# Patient Record
Sex: FEMALE | Race: WHITE | HISPANIC OR LATINO | Employment: FULL TIME | URBAN - METROPOLITAN AREA
[De-identification: names, ages, dates, MRNs, and addresses within clinical notes are randomized per-mention and may not be internally consistent; named-entity substitution may affect disease eponyms.]

---

## 2019-06-07 ENCOUNTER — TELEPHONE (OUTPATIENT)
Dept: OBSTETRICS AND GYNECOLOGY | Facility: CLINIC | Age: 39
End: 2019-06-07

## 2019-06-07 NOTE — TELEPHONE ENCOUNTER
Last seen 2015 states she has a bartholin cyst that ruptured and requesting a rx.  Advised she will have to be seen first since she has not been seen since 2015.  Scheduled 6/13.  Advised if it gets worse she should go to PCP, urgent care or the ER.

## 2019-06-13 ENCOUNTER — OFFICE VISIT (OUTPATIENT)
Dept: OBSTETRICS AND GYNECOLOGY | Facility: CLINIC | Age: 39
End: 2019-06-13
Attending: OBSTETRICS & GYNECOLOGY
Payer: COMMERCIAL

## 2019-06-13 VITALS
SYSTOLIC BLOOD PRESSURE: 100 MMHG | HEIGHT: 62 IN | DIASTOLIC BLOOD PRESSURE: 60 MMHG | WEIGHT: 116.94 LBS | BODY MASS INDEX: 21.52 KG/M2

## 2019-06-13 DIAGNOSIS — L02.91 ABSCESS: Primary | ICD-10-CM

## 2019-06-13 PROCEDURE — 87077 CULTURE AEROBIC IDENTIFY: CPT

## 2019-06-13 PROCEDURE — 99999 PR PBB SHADOW E&M-EST. PATIENT-LVL III: CPT | Mod: PBBFAC,,, | Performed by: OBSTETRICS & GYNECOLOGY

## 2019-06-13 PROCEDURE — 99999 PR PBB SHADOW E&M-EST. PATIENT-LVL III: ICD-10-PCS | Mod: PBBFAC,,, | Performed by: OBSTETRICS & GYNECOLOGY

## 2019-06-13 PROCEDURE — 87070 CULTURE OTHR SPECIMN AEROBIC: CPT

## 2019-06-13 PROCEDURE — 3008F BODY MASS INDEX DOCD: CPT | Mod: CPTII,S$GLB,, | Performed by: OBSTETRICS & GYNECOLOGY

## 2019-06-13 PROCEDURE — 87147 CULTURE TYPE IMMUNOLOGIC: CPT

## 2019-06-13 PROCEDURE — 87529 HSV DNA AMP PROBE: CPT

## 2019-06-13 PROCEDURE — 3008F PR BODY MASS INDEX (BMI) DOCUMENTED: ICD-10-PCS | Mod: CPTII,S$GLB,, | Performed by: OBSTETRICS & GYNECOLOGY

## 2019-06-13 PROCEDURE — 87186 SC STD MICRODIL/AGAR DIL: CPT

## 2019-06-13 PROCEDURE — 99203 PR OFFICE/OUTPT VISIT, NEW, LEVL III, 30-44 MIN: ICD-10-PCS | Mod: S$GLB,,, | Performed by: OBSTETRICS & GYNECOLOGY

## 2019-06-13 PROCEDURE — 99203 OFFICE O/P NEW LOW 30 MIN: CPT | Mod: S$GLB,,, | Performed by: OBSTETRICS & GYNECOLOGY

## 2019-06-13 RX ORDER — BUSPIRONE HYDROCHLORIDE 7.5 MG/1
TABLET ORAL
Refills: 1 | COMMUNITY
Start: 2019-03-27 | End: 2019-06-13

## 2019-06-13 RX ORDER — CLONAZEPAM 0.5 MG/1
TABLET ORAL
COMMUNITY
Start: 2019-06-12 | End: 2021-12-20

## 2019-06-13 NOTE — PROGRESS NOTES
Subjective:       Patient ID: Melania Lipscomb is a 39 y.o. female.    Chief Complaint:  Gynecologic Exam (Bartholin cyst in vaginal area)      There is no problem list on file for this patient.      History of Present Illness  39 y.o. yo  here for evaluation of possible bartholin's abscess. New pt old file. Has been getting boils in inguinal area when ever she is on her period and ovulating. Wondering if it is a pimple like hormonal acne. They are usually in inguinal area but this one was on left labia majora. It popped a few days ago and she wants to make sure it is not infected. Periods heavy since BTL. Previous c/s x 4. Discussed options including but not limited to OCP- if boils are hormonal that may solve both that and menorrhagia. Pt will think about it.       History reviewed. No pertinent past medical history.    Past Surgical History:   Procedure Laterality Date     SECTION      TUBAL LIGATION  2011       OB History    Para Term  AB Living   4         4   SAB TAB Ectopic Multiple Live Births                  # Outcome Date GA Lbr Abhi/2nd Weight Sex Delivery Anes PTL Lv   4             3             2             1                 Patient's last menstrual period was 2019.   Date of Last Pap: No result found    Review of Systems  Review of Systems   Constitutional: Negative for fatigue and unexpected weight change.   Respiratory: Negative for shortness of breath.    Cardiovascular: Negative for chest pain.   Gastrointestinal: Negative for abdominal pain, constipation, diarrhea, nausea and vomiting.   Genitourinary: Negative for dysuria.   Musculoskeletal: Negative for back pain.   Skin: Negative for rash.   Neurological: Negative for headaches.   Hematological: Does not bruise/bleed easily.   Psychiatric/Behavioral: Negative for behavioral problems.        Objective:   Physical Exam:   Constitutional: She appears well-developed and  well-nourished.               Genitourinary:         Genitourinary Comments: Small ulcer base of about a 1.5 cm resolving mass. I did HSV culture as precaution because the ulcer is so prominent but may just be the opening of abscess that is resolving                      Assessment/ Plan:     1. Abscess  Aerobic culture    HSV by Rapid PCR, Non-Blood [facility]; Vagina    HSV by Rapid PCR, Non-Blood [facility]; Vagina       Follow-up with me for annual  Will call with results  Antibacterial soap  No shaving

## 2019-06-14 LAB
HSV1 DNA SPEC QL NAA+PROBE: NEGATIVE
HSV2 DNA SPEC QL NAA+PROBE: NEGATIVE
SPECIMEN SOURCE: NORMAL

## 2019-06-17 LAB
BACTERIA SPEC AEROBE CULT: NORMAL
BACTERIA SPEC AEROBE CULT: NORMAL

## 2019-06-18 RX ORDER — SULFAMETHOXAZOLE AND TRIMETHOPRIM 800; 160 MG/1; MG/1
1 TABLET ORAL 2 TIMES DAILY
Qty: 14 TABLET | Refills: 0 | Status: CANCELLED | OUTPATIENT
Start: 2019-06-18 | End: 2019-06-25

## 2019-06-18 NOTE — TELEPHONE ENCOUNTER
Call pt swab from labial abscess shows staph infection. I am sending in an antibiotic. Herpes swab was normal.     Actually no pharmacy in. Please enter and send back to me and I will send in bactrim. Or pend bactrim DS po BID x 7 days

## 2019-06-19 RX ORDER — SULFAMETHOXAZOLE AND TRIMETHOPRIM 800; 160 MG/1; MG/1
2 TABLET ORAL DAILY
Qty: 14 TABLET | Refills: 0 | Status: SHIPPED | OUTPATIENT
Start: 2019-06-19 | End: 2021-07-30

## 2019-06-27 ENCOUNTER — LAB VISIT (OUTPATIENT)
Dept: LAB | Facility: OTHER | Age: 39
End: 2019-06-27
Attending: OBSTETRICS & GYNECOLOGY
Payer: COMMERCIAL

## 2019-06-27 ENCOUNTER — OFFICE VISIT (OUTPATIENT)
Dept: OBSTETRICS AND GYNECOLOGY | Facility: CLINIC | Age: 39
End: 2019-06-27
Attending: OBSTETRICS & GYNECOLOGY
Payer: COMMERCIAL

## 2019-06-27 VITALS — WEIGHT: 117.31 LBS | BODY MASS INDEX: 21.59 KG/M2 | HEIGHT: 62 IN

## 2019-06-27 DIAGNOSIS — Z11.3 SCREENING EXAMINATION FOR STD (SEXUALLY TRANSMITTED DISEASE): ICD-10-CM

## 2019-06-27 DIAGNOSIS — Z12.4 ENCOUNTER FOR PAPANICOLAOU SMEAR FOR CERVICAL CANCER SCREENING: Primary | ICD-10-CM

## 2019-06-27 DIAGNOSIS — Z01.419 ENCOUNTER FOR GYNECOLOGICAL EXAMINATION (GENERAL) (ROUTINE) WITHOUT ABNORMAL FINDINGS: ICD-10-CM

## 2019-06-27 DIAGNOSIS — N92.0 MENORRHAGIA WITH REGULAR CYCLE: ICD-10-CM

## 2019-06-27 DIAGNOSIS — Z22.321 STAPHYLOCOCCUS CARRIER: ICD-10-CM

## 2019-06-27 DIAGNOSIS — Z11.51 ENCOUNTER FOR SCREENING FOR HUMAN PAPILLOMAVIRUS (HPV): ICD-10-CM

## 2019-06-27 PROCEDURE — 88175 CYTOPATH C/V AUTO FLUID REDO: CPT

## 2019-06-27 PROCEDURE — 86592 SYPHILIS TEST NON-TREP QUAL: CPT

## 2019-06-27 PROCEDURE — 87340 HEPATITIS B SURFACE AG IA: CPT

## 2019-06-27 PROCEDURE — 99395 PREV VISIT EST AGE 18-39: CPT | Mod: S$GLB,,, | Performed by: OBSTETRICS & GYNECOLOGY

## 2019-06-27 PROCEDURE — 86703 HIV-1/HIV-2 1 RESULT ANTBDY: CPT

## 2019-06-27 PROCEDURE — 99999 PR PBB SHADOW E&M-EST. PATIENT-LVL II: CPT | Mod: PBBFAC,,, | Performed by: OBSTETRICS & GYNECOLOGY

## 2019-06-27 PROCEDURE — 36415 COLL VENOUS BLD VENIPUNCTURE: CPT

## 2019-06-27 PROCEDURE — 87624 HPV HI-RISK TYP POOLED RSLT: CPT

## 2019-06-27 PROCEDURE — 99999 PR PBB SHADOW E&M-EST. PATIENT-LVL II: ICD-10-PCS | Mod: PBBFAC,,, | Performed by: OBSTETRICS & GYNECOLOGY

## 2019-06-27 PROCEDURE — 99395 PR PREVENTIVE VISIT,EST,18-39: ICD-10-PCS | Mod: S$GLB,,, | Performed by: OBSTETRICS & GYNECOLOGY

## 2019-06-27 PROCEDURE — 87491 CHLMYD TRACH DNA AMP PROBE: CPT

## 2019-06-27 RX ORDER — MUPIROCIN 20 MG/G
OINTMENT TOPICAL 2 TIMES DAILY
Qty: 30 G | Refills: 1 | Status: SHIPPED | OUTPATIENT
Start: 2019-06-27 | End: 2019-07-04

## 2019-06-27 NOTE — PROGRESS NOTES
Subjective:       Patient ID: Melania Lipscomb is a 39 y.o. female.    Chief Complaint:  Annual Exam (last pap 2016 normal via pt)      There is no problem list on file for this patient.      History of Present Illness  39 y.o. yo  here for annual exam. Boil better after antibiotics, explained staph to pt. All questions answered. rec Bactroban. Also wants to try OCP for menorrhagia since BTL. Wants STD testing   I explained new pap and HPV guidelines. Will do pap and HPV test today. Will repeat pap and HPV every 3 years. Answered all questions. Patient agrees.     History reviewed. No pertinent past medical history.    Past Surgical History:   Procedure Laterality Date     SECTION      TUBAL LIGATION  2011       OB History    Para Term  AB Living   4         4   SAB TAB Ectopic Multiple Live Births                  # Outcome Date GA Lbr Abhi/2nd Weight Sex Delivery Anes PTL Lv   4             3             2             1                 Patient's last menstrual period was 2019 (exact date).   Date of Last Pap: No result found    Review of Systems  Review of Systems   Constitutional: Negative for fatigue and unexpected weight change.   Respiratory: Negative for shortness of breath.    Cardiovascular: Negative for chest pain.   Gastrointestinal: Negative for abdominal pain, constipation, diarrhea, nausea and vomiting.   Genitourinary: Negative for dysuria.   Musculoskeletal: Negative for back pain.   Skin: Negative for rash.   Neurological: Negative for headaches.   Hematological: Does not bruise/bleed easily.   Psychiatric/Behavioral: Negative for behavioral problems.        Objective:   Physical Exam:   Constitutional: She is oriented to person, place, and time. Vital signs are normal. She appears well-developed and well-nourished. No distress.        Pulmonary/Chest: She exhibits no mass. Right breast exhibits no mass, no nipple discharge, no skin  change, no tenderness, no bleeding and no swelling. Left breast exhibits no mass, no nipple discharge, no skin change, no tenderness, no bleeding and no swelling. Breasts are symmetrical.        Abdominal: Soft. Normal appearance and bowel sounds are normal. She exhibits no distension and no mass. There is no tenderness. There is no rebound.     Genitourinary: Vagina normal and uterus normal. There is no rash, tenderness, lesion or injury on the right labia. There is no rash, tenderness, lesion or injury on the left labia. Uterus is not deviated, not enlarged, not fixed, not tender, not hosting fibroids and not experiencing uterine prolapse. Cervix is normal. Right adnexum displays no mass, no tenderness and no fullness. Left adnexum displays no mass, no tenderness and no fullness. No erythema, tenderness, rectocele, cystocele or unspecified prolapse of vaginal walls in the vagina. No vaginal discharge found. Cervix exhibits no motion tenderness, no discharge and no friability.           Musculoskeletal: Normal range of motion and moves all extremeties.      Lymphadenopathy:     She has no axillary adenopathy.        Right: No supraclavicular adenopathy present.        Left: No supraclavicular adenopathy present.    Neurological: She is alert and oriented to person, place, and time.    Skin: Skin is warm and dry.    Psychiatric: She has a normal mood and affect. Her behavior is normal. Judgment normal.        Assessment/ Plan:     1. Encounter for Papanicolaou smear for cervical cancer screening  Liquid-based pap smear, screening   2. Encounter for screening for human papillomavirus (HPV)  HPV High Risk Genotypes, PCR   3. Screening examination for STD (sexually transmitted disease)  RPR    C. trachomatis/N. gonorrhoeae by AMP DNA    Hepatitis B surface antigen    HIV 1/2 Ag/Ab (4th Gen)   4. Staphylococcus carrier  mupirocin (BACTROBAN) 2 % ointment   5. Menorrhagia with regular cycle  norgestrel-ethinyl estradiol  (LO/OVRAL) 0.3-30 mg-mcg per tablet   6. Encounter for gynecological examination (general) (routine) without abnormal findings         Follow-up with me in 1 year

## 2019-06-28 LAB
C TRACH DNA SPEC QL NAA+PROBE: NOT DETECTED
HBV SURFACE AG SERPL QL IA: NEGATIVE
HIV 1+2 AB+HIV1 P24 AG SERPL QL IA: NEGATIVE
N GONORRHOEA DNA SPEC QL NAA+PROBE: NOT DETECTED
RPR SER QL: NORMAL

## 2019-07-03 LAB
HPV HR 12 DNA CVX QL NAA+PROBE: NEGATIVE
HPV16 AG SPEC QL: NEGATIVE
HPV18 DNA SPEC QL NAA+PROBE: NEGATIVE

## 2024-03-27 ENCOUNTER — TELEPHONE (OUTPATIENT)
Dept: HEMATOLOGY/ONCOLOGY | Facility: CLINIC | Age: 44
End: 2024-03-27
Payer: COMMERCIAL

## 2024-03-27 NOTE — TELEPHONE ENCOUNTER
LVM for pt to call back to schedule from [facility] referral.  Referral to diagnostic clinic in [facility].  Try to patsy w/ Dr Senior for April 9th.      Pt returned call and discussed diagnostic clinic referral.  Scheduled pt with first available new pt dx clinic appt, April 9th.  Pt confirmed appt date time and location.

## 2024-04-09 ENCOUNTER — LAB VISIT (OUTPATIENT)
Dept: LAB | Facility: HOSPITAL | Age: 44
End: 2024-04-09
Attending: INTERNAL MEDICINE
Payer: COMMERCIAL

## 2024-04-09 ENCOUNTER — OFFICE VISIT (OUTPATIENT)
Dept: HEMATOLOGY/ONCOLOGY | Facility: CLINIC | Age: 44
End: 2024-04-09
Payer: COMMERCIAL

## 2024-04-09 VITALS
DIASTOLIC BLOOD PRESSURE: 67 MMHG | OXYGEN SATURATION: 97 % | SYSTOLIC BLOOD PRESSURE: 99 MMHG | WEIGHT: 129.44 LBS | TEMPERATURE: 97 F | HEART RATE: 63 BPM | BODY MASS INDEX: 23.67 KG/M2

## 2024-04-09 DIAGNOSIS — R59.0 INGUINAL LYMPHADENOPATHY: ICD-10-CM

## 2024-04-09 LAB
CRP SERPL-MCNC: 0.5 MG/L (ref 0–8.2)
ERYTHROCYTE [SEDIMENTATION RATE] IN BLOOD BY PHOTOMETRIC METHOD: 5 MM/HR (ref 0–36)
LDH SERPL L TO P-CCNC: 215 U/L (ref 110–260)

## 2024-04-09 PROCEDURE — 99999 PR PBB SHADOW E&M-EST. PATIENT-LVL III: CPT | Mod: PBBFAC,,, | Performed by: INTERNAL MEDICINE

## 2024-04-09 PROCEDURE — 3078F DIAST BP <80 MM HG: CPT | Mod: CPTII,S$GLB,, | Performed by: INTERNAL MEDICINE

## 2024-04-09 PROCEDURE — 86668 FRANCISELLA TULARENSIS: CPT | Performed by: INTERNAL MEDICINE

## 2024-04-09 PROCEDURE — 36415 COLL VENOUS BLD VENIPUNCTURE: CPT | Mod: PN | Performed by: INTERNAL MEDICINE

## 2024-04-09 PROCEDURE — 3074F SYST BP LT 130 MM HG: CPT | Mod: CPTII,S$GLB,, | Performed by: INTERNAL MEDICINE

## 2024-04-09 PROCEDURE — 3008F BODY MASS INDEX DOCD: CPT | Mod: CPTII,S$GLB,, | Performed by: INTERNAL MEDICINE

## 2024-04-09 PROCEDURE — 85652 RBC SED RATE AUTOMATED: CPT | Performed by: INTERNAL MEDICINE

## 2024-04-09 PROCEDURE — 86140 C-REACTIVE PROTEIN: CPT | Performed by: INTERNAL MEDICINE

## 2024-04-09 PROCEDURE — 99204 OFFICE O/P NEW MOD 45 MIN: CPT | Mod: S$GLB,,, | Performed by: INTERNAL MEDICINE

## 2024-04-09 PROCEDURE — 83615 LACTATE (LD) (LDH) ENZYME: CPT | Mod: PN | Performed by: INTERNAL MEDICINE

## 2024-04-09 PROCEDURE — 1159F MED LIST DOCD IN RCRD: CPT | Mod: CPTII,S$GLB,, | Performed by: INTERNAL MEDICINE

## 2024-04-09 NOTE — PROGRESS NOTES
DIAGNOSIS CLINIC NOTE    Name: Melania Lipscomb  MRN:  [id number]  :  1980 Age 44 y.o.  Date of Service: 2024    Reason for visit:  Melania Lipscomb is a 44 y.o. female here regarding right inguinal lymphadenopathy.    ONCOLOGICAL HISTORY/HISTORY OF PRESENT ILLNESS:  Melania Lipscomb has medical problems including    Melania Lipscomb presents to diagnosis for evaluation of right inguinal lymphadenopathy greater than 1 month    Patient reports as a referral from PCP after having a STD workup and generalized workup for right inguinal localized lymphadenopathy which was persistent since approximately Feb.  She reports she initially thought this was related to her menstrual cycles but reports it was persistent.    She has had STD testing including gonorrhea and chlamydia, HIV, hepatitis all of which have been negative.  She reports a lymphadenopathy is painless.    US of Right groin (3/26/24)-There are 3 lymph nodes visualized in the right groin, 1 of which measures 1.0 x 0.9 x 0.8 cm, 1 of which measures 2.2 x 0.8 x 0.9 cm, and the other 1.0 x 0.4 x 0.7 cm. In the left groin there are 2 nodes visualized 1 of which measures 0.8 x 0.5 x 0.9 cm and the other 0.6 x 0.5 x 0.6 cm.     She has no B symptoms and has weight gain over the last 3 months.  She reports she had a transient GI illness associated with mild diarrhea prior to the lymphadenopathy.    She reports her daughter recently brought home a bunny as a pet.     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      Past Medical History:   Diagnosis Date    Anemia     Migraines     Vertigo of central origin        Past Surgical History:   Procedure Laterality Date     SECTION      TUBAL LIGATION  2011       Allergies as of 2024 - Reviewed 2024   Allergen Reaction Noted    Hydrocodone Other (See Comments) 2014       Family History   Problem Relation Age of Onset     Breast cancer Mother         50's       Social History     Tobacco Use    Smoking status: Never    Smokeless tobacco: Never   Substance Use Topics    Alcohol use: Yes    Drug use: No         PHYSICAL EXAMINATION:  BP 99/67 (BP Location: Right arm, Patient Position: Sitting, BP Method: Medium (Manual))   Pulse 63   Temp 96.9 °F (36.1 °C) (Temporal)   Wt 58.7 kg (129 lb 6.6 oz)   SpO2 97%   BMI 23.67 kg/m²   Wt Readings from Last 3 Encounters:   04/09/24 58.7 kg (129 lb 6.6 oz)   03/21/24 57.7 kg (127 lb 4.8 oz)   03/18/24 56.7 kg (125 lb)     ECOG PERFORMANCE STATUS: 0  General:  Well-appearing, nontoxic  Eyes:  Equal and round pupils, EOMI, no scleral icterus  Mouth:  No lesions, moist  Cardiovascular:  Warm, well-perfused, no peripheral edema  Lungs:  Unlabored on room air, no wheezing  Neurologic:  Awake, alert and oriented, participating in the exam  Psych:  Appropriate mood and affect  Skin:  Normal pallor, No rashes  Heme:  No petechiae, no purpura  Lymph:  Complete lymphadenopathy exam was unremarkable, no inguinal lymphadenopathy, no axillary lymphadenopathy, no cervical lymphadenopathy no occipital lymphadenopathy    LABORATORY:  CBC  Lab Results   Component Value Date    WBC 4.72 03/21/2024    HGB 12.0 03/21/2024    HCT 37.1 03/21/2024    MCV 88 03/21/2024     03/21/2024         BMP  Lab Results   Component Value Date     08/31/2023    K 4.4 08/31/2023     08/31/2023    CO2 23 08/31/2023    BUN 15 08/31/2023    CREATININE 0.83 08/31/2023    CALCIUM 9.1 08/31/2023    ANIONGAP 8 08/31/2023    ESTGFRAFRICA >60 12/28/2021    EGFRNONAA >60 12/28/2021           PERTINENT PATHOLOGY:        PERTINENT RADIOLOGY:  US of Right groin (3/26/24)-There are 3 lymph nodes visualized in the right groin, 1 of which measures 1.0 x 0.9 x 0.8 cm, 1 of which measures 2.2 x 0.8 x 0.9 cm, and the other 1.0 x 0.4 x 0.7 cm. In the left groin there are 2 nodes visualized 1 of which measures 0.8 x 0.5 x 0.9  cm and the other 0.6 x 0.5 x 0.6 cm.         ASSESSMENT AND PLAN:    Melania Lipscomb is a 44 y.o. female with...    # subjective right inguinal lymphadenopathy  -she has no B symptoms, physical exam is unremarkable, we checked inflammatory markers which were within normal limits  -LDH was normal which is reassuring from a leukemia/lymphoma standpoint although my suspicion was low  -review of her CBC from 03/21/2024 shows normal white count normal hemoglobin normal platelets and a completely normal differential making a hematological related process unlikely.  -she reports her daughter has a bunny that lives in her daughter's room, we will check for tularemia however this is unlikely but given she has an unusual pet its worth checking  -I favor this was a transient GI/viral illness and lymphadenopathy we will/has resolve on its own, repeating ultrasound of the right groin in 2 weeks, if LAD of one LN noted above is persistent would favor getting a core biopsy via IR, I counseled the patient on this plan and she was agreeable    Please note, presumed malignancy symptom management prior to tissue diagnosis remains the responsibility of the referring physician, primary care, or emergency department.  Symptom management will transfer to the treating oncologist after tissue diagnosis and initial visit with the treating oncologist.           Jillian Senior M.D.  Hematology/Oncology     Route Chart for Scheduling    Med Onc Chart Routing      Follow up with physician 2 weeks. 2 weeks vritual visit   Follow up with [facility]    Infusion scheduling note    Injection scheduling note    Labs   Scheduling:  Preferred lab:  Lab interval:  Collect all blood work today   Imaging   US 1 day prior to virtual visit in weeks   Pharmacy appointment    Other referrals

## 2024-04-11 PROBLEM — R59.0 INGUINAL LYMPHADENOPATHY: Status: ACTIVE | Noted: 2024-04-11

## 2024-04-11 LAB
F TULAR IGG SER QL IA: POSITIVE
F TULAR IGG+IGM SER-IMP: ABNORMAL
F TULAR IGM SER QL IA: NEGATIVE

## 2024-04-15 DIAGNOSIS — A21.9 TULAREMIA: Primary | ICD-10-CM

## 2024-04-15 RX ORDER — CIPROFLOXACIN 500 MG/1
500 TABLET ORAL 2 TIMES DAILY
Qty: 20 TABLET | Refills: 0 | Status: SHIPPED | OUTPATIENT
Start: 2024-04-15 | End: 2024-04-25

## 2024-04-25 ENCOUNTER — TELEPHONE (OUTPATIENT)
Dept: HEMATOLOGY/ONCOLOGY | Facility: CLINIC | Age: 44
End: 2024-04-25
Payer: COMMERCIAL

## 2024-04-25 NOTE — TELEPHONE ENCOUNTER
Scrubbing for 4/29 and found patient had cancelled her u/s appt for this follow up. Left message for patient to see if we need to reschedule follow up as well.

## 2024-05-22 ENCOUNTER — TELEPHONE (OUTPATIENT)
Dept: HEMATOLOGY/ONCOLOGY | Facility: CLINIC | Age: 44
End: 2024-05-22
Payer: COMMERCIAL

## 2024-05-22 NOTE — TELEPHONE ENCOUNTER
Attempt to contact the pt to reschedule her canceled f/u visit  in diagnosis clinica and [facility] for pt to call back.  Previous msg the pt would prefer Virtual visit.    Sent pt portal msg to pt.

## 2024-05-30 ENCOUNTER — OFFICE VISIT (OUTPATIENT)
Dept: FAMILY MEDICINE | Facility: CLINIC | Age: 44
End: 2024-05-30
Payer: COMMERCIAL

## 2024-05-30 VITALS
HEART RATE: 80 BPM | HEIGHT: 62 IN | BODY MASS INDEX: 24.18 KG/M2 | DIASTOLIC BLOOD PRESSURE: 62 MMHG | SYSTOLIC BLOOD PRESSURE: 90 MMHG | WEIGHT: 131.38 LBS | OXYGEN SATURATION: 98 %

## 2024-05-30 DIAGNOSIS — D64.9 LOW HEMOGLOBIN: ICD-10-CM

## 2024-05-30 DIAGNOSIS — R20.0 NUMBNESS: ICD-10-CM

## 2024-05-30 DIAGNOSIS — F32.89 OTHER DEPRESSION: ICD-10-CM

## 2024-05-30 DIAGNOSIS — R41.3 MEMORY LOSS: Primary | ICD-10-CM

## 2024-05-30 PROCEDURE — 3078F DIAST BP <80 MM HG: CPT | Mod: CPTII,S$GLB,, | Performed by: FAMILY MEDICINE

## 2024-05-30 PROCEDURE — 1159F MED LIST DOCD IN RCRD: CPT | Mod: CPTII,S$GLB,, | Performed by: FAMILY MEDICINE

## 2024-05-30 PROCEDURE — 99999 PR PBB SHADOW E&M-EST. PATIENT-LVL III: CPT | Mod: PBBFAC,,, | Performed by: FAMILY MEDICINE

## 2024-05-30 PROCEDURE — 3008F BODY MASS INDEX DOCD: CPT | Mod: CPTII,S$GLB,, | Performed by: FAMILY MEDICINE

## 2024-05-30 PROCEDURE — 99214 OFFICE O/P EST MOD 30 MIN: CPT | Mod: S$GLB,,, | Performed by: FAMILY MEDICINE

## 2024-05-30 PROCEDURE — 3074F SYST BP LT 130 MM HG: CPT | Mod: CPTII,S$GLB,, | Performed by: FAMILY MEDICINE

## 2024-05-30 RX ORDER — FLUOXETINE HYDROCHLORIDE 20 MG/1
20 CAPSULE ORAL DAILY
Qty: 90 CAPSULE | Refills: 3 | Status: SHIPPED | OUTPATIENT
Start: 2024-05-30 | End: 2025-05-30

## 2024-05-30 NOTE — PROGRESS NOTES
Assessment:       1. Memory loss    2. Low hemoglobin    3. Other depression    4. Numbness        Assessment & Plan  Memory loss:  New problem workup needed  -     Homocysteine, serum; Future; Expected date: 05/30/2024  -     Vitamin B12; Future; Expected date: 05/30/2024  -     Copper, serum; Future; Expected date: 05/30/2024  -     VITAMIN B6; Future; Expected date: 05/30/2024  -     Comprehensive Metabolic Panel; Future; Expected date: 05/30/2024    Low hemoglobin: New problem workup needed  -     CBC Auto Differential; Future; Expected date: 05/30/2024  -     Iron and TIBC; Future; Expected date: 05/30/2024  -     FERRITIN; Future; Expected date: 05/30/2024    Other depression: Worsening  -     FLUoxetine 20 MG capsule; Take 1 capsule (20 mg total) by mouth once daily.  Dispense: 90 capsule; Refill: 3    Numbness: New problem workup needed  -     Folate; Future; Expected date: 05/30/2024      The patient's thyroid function was evaluated 9 months ago, yielding normal results. Her hemoglobin levels were found to be on the lower side. A prescription for Prozac 20 mg, to be taken once daily in the morning, has been issued. Fasting blood work, including iron levels, homocysteine levels, vitamin B12, vitamin B6, and folic acid levels, will be conducted tomorrow morning. A Pap smear will be scheduled for her in 6 months.    Follow-up  A follow-up visit is scheduled for 6 months from now.      The patient's BMI has been recorded in the chart. The patient has been provided educational materials regarding the benefits of attaining and maintaining a normal weight. We will continue to address and follow this issue during follow up visits.   Patient agreed with assessment and plan. Patient verbalized understanding.     Subjective:       Patient ID: Melania Lipscomb is a 44 y.o. female.    Chief Complaint: Establish Care    HPI  History of Present Illness  The patient presents To establish a new primary care physician.    The  patient expresses a desire to switch her medication from Cymbalta to Prozac due to concerns about her memory. She reports difficulty in recalling minor tasks, a symptom she had not previously encountered. Her laboratory tests are current, and she recently consulted with a hematologist for an enlarged lymph node. She contracted COVID-19 twice, the most recent of which was in 11/2022. Over the past three months, she has experienced a weight gain of 15 pounds, which she attributes to her medication. Her memory loss has progressively worsened over the past year. She experiences heavy menstrual cycles, particularly during the first and third day, and experiences severe PMDD mood swings like two weeks prior to the onset of her menstrual cycle. She was previously on Prozac, which she found beneficial for her PMDD, but discontinued it due to memory issues. Cymbalta has been effective in managing her pain. She experienced a panic attack and anxiety attack yesterday, which prompted her to take Cymbalta. She has a history of numbness and tingling sensations, which have since resolved. She has a history of severe migraines 9 to 10 years ago, which occasionally cause facial numbness. She was referred to a neurologist in [place], but did not follow up. She continues to experience facial numbness, which she attributes to her severe migraines. She is currently experiencing an earache. She has not undergone a Pap smear.     Past medical history, past social history was reviewed and discussed with the patient.    Review of Systems   Respiratory:  Negative for apnea and chest tightness.    Cardiovascular:  Negative for chest pain and leg swelling.   Gastrointestinal:  Negative for abdominal distention and abdominal pain.   Neurological:  Positive for numbness and headaches.       Objective:      Physical Exam    Physical Exam  Vital Signs  Patient's weight is 131.   The patient is in no acute distress, lungs are clear to auscultation,  heart is regular rate and rhythm, extremity has no presence of edema, normal sensation, no weakness.  Results  Laboratory Studies  Hemoglobin was on the lower side.     This note was generated with the assistance of ambient listening technology. Verbal consent was obtained by the patient and accompanying visitor(s) for the recording of patient appointment to facilitate this note. I attest to having reviewed and edited the generated note for accuracy, though some syntax or spelling errors may persist. Please contact the author of this note for any clarification.

## 2024-06-05 ENCOUNTER — TELEPHONE (OUTPATIENT)
Dept: PSYCHIATRY | Facility: CLINIC | Age: 44
End: 2024-06-05
Payer: COMMERCIAL

## 2024-06-26 ENCOUNTER — PATIENT MESSAGE (OUTPATIENT)
Dept: PSYCHIATRY | Facility: CLINIC | Age: 44
End: 2024-06-26
Payer: COMMERCIAL

## 2024-06-28 ENCOUNTER — DOCUMENTATION ONLY (OUTPATIENT)
Dept: FAMILY MEDICINE | Facility: CLINIC | Age: 44
End: 2024-06-28
Payer: COMMERCIAL

## 2024-06-28 ENCOUNTER — OFFICE VISIT (OUTPATIENT)
Dept: FAMILY MEDICINE | Facility: CLINIC | Age: 44
End: 2024-06-28
Payer: COMMERCIAL

## 2024-06-28 ENCOUNTER — TELEPHONE (OUTPATIENT)
Dept: FAMILY MEDICINE | Facility: CLINIC | Age: 44
End: 2024-06-28

## 2024-06-28 ENCOUNTER — LAB VISIT (OUTPATIENT)
Dept: LAB | Facility: HOSPITAL | Age: 44
End: 2024-06-28
Attending: FAMILY MEDICINE
Payer: COMMERCIAL

## 2024-06-28 VITALS
DIASTOLIC BLOOD PRESSURE: 60 MMHG | HEART RATE: 96 BPM | SYSTOLIC BLOOD PRESSURE: 118 MMHG | OXYGEN SATURATION: 98 % | BODY MASS INDEX: 24.18 KG/M2 | HEIGHT: 62 IN | WEIGHT: 131.38 LBS

## 2024-06-28 DIAGNOSIS — R41.3 MEMORY LOSS: ICD-10-CM

## 2024-06-28 DIAGNOSIS — D64.9 LOW HEMOGLOBIN: ICD-10-CM

## 2024-06-28 DIAGNOSIS — R53.83 OTHER FATIGUE: ICD-10-CM

## 2024-06-28 DIAGNOSIS — G43.109 MIGRAINE WITH AURA AND WITHOUT STATUS MIGRAINOSUS, NOT INTRACTABLE: ICD-10-CM

## 2024-06-28 DIAGNOSIS — R20.0 NUMBNESS: ICD-10-CM

## 2024-06-28 DIAGNOSIS — F41.9 ANXIETY: Primary | ICD-10-CM

## 2024-06-28 LAB
ALBUMIN SERPL BCP-MCNC: 3.7 G/DL (ref 3.5–5.2)
ALP SERPL-CCNC: 58 U/L (ref 55–135)
ALT SERPL W/O P-5'-P-CCNC: 27 U/L (ref 10–44)
ANION GAP SERPL CALC-SCNC: 8 MMOL/L (ref 8–16)
AST SERPL-CCNC: 33 U/L (ref 10–40)
BASOPHILS # BLD AUTO: 0.05 K/UL (ref 0–0.2)
BASOPHILS NFR BLD: 0.8 % (ref 0–1.9)
BILIRUB SERPL-MCNC: 0.2 MG/DL (ref 0.1–1)
BUN SERPL-MCNC: 17 MG/DL (ref 6–20)
CALCIUM SERPL-MCNC: 9.3 MG/DL (ref 8.7–10.5)
CHLORIDE SERPL-SCNC: 106 MMOL/L (ref 95–110)
CO2 SERPL-SCNC: 26 MMOL/L (ref 23–29)
CREAT SERPL-MCNC: 1 MG/DL (ref 0.5–1.4)
DIFFERENTIAL METHOD BLD: ABNORMAL
EOSINOPHIL # BLD AUTO: 0.1 K/UL (ref 0–0.5)
EOSINOPHIL NFR BLD: 1.5 % (ref 0–8)
ERYTHROCYTE [DISTWIDTH] IN BLOOD BY AUTOMATED COUNT: 13.5 % (ref 11.5–14.5)
EST. GFR  (NO RACE VARIABLE): >60 ML/MIN/1.73 M^2
FERRITIN SERPL-MCNC: 21 NG/ML (ref 20–300)
FOLATE SERPL-MCNC: 7.5 NG/ML (ref 4–24)
GLUCOSE SERPL-MCNC: 89 MG/DL (ref 70–110)
HCT VFR BLD AUTO: 35.9 % (ref 37–48.5)
HCYS SERPL-SCNC: 5.8 UMOL/L (ref 4–15.5)
HGB BLD-MCNC: 11.9 G/DL (ref 12–16)
IMM GRANULOCYTES # BLD AUTO: 0.02 K/UL (ref 0–0.04)
IMM GRANULOCYTES NFR BLD AUTO: 0.3 % (ref 0–0.5)
IRON SERPL-MCNC: 71 UG/DL (ref 30–160)
LYMPHOCYTES # BLD AUTO: 1.7 K/UL (ref 1–4.8)
LYMPHOCYTES NFR BLD: 28 % (ref 18–48)
MCH RBC QN AUTO: 28.9 PG (ref 27–31)
MCHC RBC AUTO-ENTMCNC: 33.1 G/DL (ref 32–36)
MCV RBC AUTO: 87 FL (ref 82–98)
MONOCYTES # BLD AUTO: 0.3 K/UL (ref 0.3–1)
MONOCYTES NFR BLD: 5.4 % (ref 4–15)
NEUTROPHILS # BLD AUTO: 3.9 K/UL (ref 1.8–7.7)
NEUTROPHILS NFR BLD: 64 % (ref 38–73)
NRBC BLD-RTO: 0 /100 WBC
PLATELET # BLD AUTO: 269 K/UL (ref 150–450)
PMV BLD AUTO: 12.7 FL (ref 9.2–12.9)
POTASSIUM SERPL-SCNC: 4.1 MMOL/L (ref 3.5–5.1)
PROT SERPL-MCNC: 7 G/DL (ref 6–8.4)
RBC # BLD AUTO: 4.12 M/UL (ref 4–5.4)
SATURATED IRON: 17 % (ref 20–50)
SODIUM SERPL-SCNC: 140 MMOL/L (ref 136–145)
TOTAL IRON BINDING CAPACITY: 417 UG/DL (ref 250–450)
TRANSFERRIN SERPL-MCNC: 282 MG/DL (ref 200–375)
VIT B12 SERPL-MCNC: 1056 PG/ML (ref 210–950)
WBC # BLD AUTO: 6.08 K/UL (ref 3.9–12.7)

## 2024-06-28 PROCEDURE — 82525 ASSAY OF COPPER: CPT | Performed by: FAMILY MEDICINE

## 2024-06-28 PROCEDURE — 99999 PR PBB SHADOW E&M-EST. PATIENT-LVL III: CPT | Mod: PBBFAC,,, | Performed by: FAMILY MEDICINE

## 2024-06-28 PROCEDURE — 80053 COMPREHEN METABOLIC PANEL: CPT | Performed by: FAMILY MEDICINE

## 2024-06-28 PROCEDURE — 83540 ASSAY OF IRON: CPT | Performed by: FAMILY MEDICINE

## 2024-06-28 PROCEDURE — 84207 ASSAY OF VITAMIN B-6: CPT | Performed by: FAMILY MEDICINE

## 2024-06-28 PROCEDURE — 82728 ASSAY OF FERRITIN: CPT | Performed by: FAMILY MEDICINE

## 2024-06-28 PROCEDURE — 82607 VITAMIN B-12: CPT | Performed by: FAMILY MEDICINE

## 2024-06-28 PROCEDURE — 82746 ASSAY OF FOLIC ACID SERUM: CPT | Performed by: FAMILY MEDICINE

## 2024-06-28 PROCEDURE — 36415 COLL VENOUS BLD VENIPUNCTURE: CPT | Mod: PO | Performed by: FAMILY MEDICINE

## 2024-06-28 PROCEDURE — 85025 COMPLETE CBC W/AUTO DIFF WBC: CPT | Performed by: FAMILY MEDICINE

## 2024-06-28 PROCEDURE — 83090 ASSAY OF HOMOCYSTEINE: CPT | Performed by: FAMILY MEDICINE

## 2024-06-28 RX ORDER — RIMEGEPANT SULFATE 75 MG/75MG
75 TABLET, ORALLY DISINTEGRATING ORAL ONCE AS NEEDED
Qty: 8 TABLET | Refills: 0 | Status: SHIPPED | OUTPATIENT
Start: 2024-06-28 | End: 2024-06-28

## 2024-06-28 RX ORDER — ALPRAZOLAM 0.5 MG/1
0.5 TABLET ORAL NIGHTLY PRN
Qty: 15 TABLET | Refills: 0 | Status: SHIPPED | OUTPATIENT
Start: 2024-06-28 | End: 2024-07-13

## 2024-06-28 RX ORDER — FLUOXETINE 10 MG/1
10 CAPSULE ORAL DAILY
Qty: 30 CAPSULE | Refills: 11 | Status: SHIPPED | OUTPATIENT
Start: 2024-06-28 | End: 2025-06-28

## 2024-06-28 NOTE — PROGRESS NOTES
Assessment:       1. Anxiety    2. Other fatigue    3. Migraine with aura and without status migrainosus, not intractable    4. Memory loss        Plan:       Anxiety:  Worsening  -     FLUoxetine 10 MG capsule; Take 1 capsule (10 mg total) by mouth once daily.  Dispense: 30 capsule; Refill: 11  -     ALPRAZolam (XANAX) 0.5 MG tablet; Take 1 tablet (0.5 mg total) by mouth nightly as needed for Anxiety.  Dispense: 15 tablet; Refill: 0    Other fatigue:  Worsening    Migraine with aura and without status migrainosus, not intractable: Worsening  -     rimegepant (NURTEC) 75 mg odt; Take 1 tablet (75 mg total) by mouth once as needed for Migraine. Place ODT tablet on the tongue; alternatively the ODT tablet may be placed under the tongue  Dispense: 8 tablet; Refill: 0  -     Ambulatory referral/consult to Neurology; Future; Expected date: 07/05/2024    Memory loss: Worsening  -     MRI Brain Without Contrast; Future; Expected date: 06/28/2024         Will increase the dosage of the fluoxetine to 30 mg daily, will add fluoxetine capsules 10 mg to the 20 mg that the patient already is taking.  [place] prescription monitoring program was checked and okay, Xanax was prescribed only as needed.  Fifteen tablets and no refills were given to the patient.  Will start patient on Nurtec, as the patient migraine headaches are getting worse and not controlled with Tylenol extra-strength or ibuprofen.  Will also refer the patient to neurologist.  Because patient is been having new worsening symptoms in the last few weeks and patient never had imaging studies, will also order an MRI of the brain.   Patient agreed with assessment and plan. Patient verbalized understanding.     Subjective:       Patient ID: Melania Lipscomb is a 44 y.o. female.    Chief Complaint: Headache (Headache started Monday. On going until Thursday ) and Anxiety (Change medication dosage )    HPI    The patient is here today complaining of headaches, the patient  has been having headaches since she was little, never had a imaging study, she was diagnosed with migraine headaches for the last few weeks, the patient has been complaining worsening symptoms and not able to tolerate the headache as the symptoms sometimes are severe.  The patient stated that sometimes she feels numbness sensation in her face and shooting pains.  The numbness sensation can be on the right or the left side.  The patient also is been having worsening anxiety symptoms, she is currently taking fluoxetine, she would like to know if the medicine can be adjusted.  She has also been complaining of memory loss.  The patient stated that short term memory is the worse but she also has problems with long-term memory.  She is feeling very tired.    Past medical history, past social history was reviewed and discussed with the patient.    Review of Systems   Respiratory:  Negative for cough and choking.    Cardiovascular:  Negative for chest pain and leg swelling.   Neurological:  Positive for numbness and headaches.   Psychiatric/Behavioral:  Positive for dysphoric mood. The patient is nervous/anxious.        Objective:      Physical Exam  Vitals and nursing note reviewed.   Constitutional:       General: She is not in acute distress.     Appearance: Normal appearance. She is well-developed and normal weight. She is not diaphoretic.   HENT:      Head: Normocephalic and atraumatic.      Right Ear: External ear normal.      Left Ear: External ear normal.      Nose: Nose normal.   Eyes:      General: No scleral icterus.        Right eye: No discharge.         Left eye: No discharge.      Conjunctiva/sclera: Conjunctivae normal.      Pupils: Pupils are equal, round, and reactive to light.   Cardiovascular:      Rate and Rhythm: Normal rate and regular rhythm.      Heart sounds: Normal heart sounds.   Pulmonary:      Effort: Pulmonary effort is normal. No respiratory distress.      Breath sounds: Normal breath sounds.  No wheezing.   Musculoskeletal:         General: No tenderness or deformity.      Cervical back: Neck supple.   Neurological:      Mental Status: She is alert.   Psychiatric:         Mood and Affect: Mood is anxious.         Behavior: Behavior normal.         Thought Content: Thought content normal.         Judgment: Judgment normal.

## 2024-06-30 ENCOUNTER — PATIENT MESSAGE (OUTPATIENT)
Dept: FAMILY MEDICINE | Facility: CLINIC | Age: 44
End: 2024-06-30
Payer: COMMERCIAL

## 2024-06-30 DIAGNOSIS — G43.109 MIGRAINE WITH AURA AND WITHOUT STATUS MIGRAINOSUS, NOT INTRACTABLE: ICD-10-CM

## 2024-06-30 DIAGNOSIS — G43.009 MIGRAINE WITHOUT AURA AND WITHOUT STATUS MIGRAINOSUS, NOT INTRACTABLE: Primary | ICD-10-CM

## 2024-07-01 RX ORDER — SUMATRIPTAN 50 MG/1
TABLET, FILM COATED ORAL
Qty: 9 TABLET | Refills: 2 | Status: SHIPPED | OUTPATIENT
Start: 2024-07-01
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
## 2024-07-02 LAB — COPPER SERPL-MCNC: 1098 UG/L (ref 810–1990)

## 2024-07-03 ENCOUNTER — PATIENT MESSAGE (OUTPATIENT)
Dept: PSYCHIATRY | Facility: CLINIC | Age: 44
End: 2024-07-03
Payer: COMMERCIAL

## 2024-07-03 LAB — PYRIDOXAL SERPL-MCNC: 156 UG/L (ref 5–50)

## 2024-07-05 ENCOUNTER — TELEPHONE (OUTPATIENT)
Dept: NEUROLOGY | Facility: CLINIC | Age: 44
End: 2024-07-05
Payer: COMMERCIAL

## 2024-07-05 ENCOUNTER — CLINICAL SUPPORT (OUTPATIENT)
Dept: PSYCHIATRY | Facility: CLINIC | Age: 44
End: 2024-07-05
Payer: COMMERCIAL

## 2024-07-05 DIAGNOSIS — F41.1 GAD (GENERALIZED ANXIETY DISORDER): ICD-10-CM

## 2024-07-05 DIAGNOSIS — F33.1 MODERATE EPISODE OF RECURRENT MAJOR DEPRESSIVE DISORDER: Primary | ICD-10-CM

## 2024-07-05 PROCEDURE — 99999 PR PBB SHADOW E&M-EST. PATIENT-LVL II: CPT | Mod: PBBFAC,,, | Performed by: COUNSELOR

## 2024-07-05 NOTE — PROGRESS NOTES
Individual Psychotherapy (LPC)    07/19/2024    Interim Events/Subjective Report/Content of Current Session:  follow-up appointment.    Pt is a 44 y.o. female with past psychiatric hx of  Depression, anger, and anxiety who presents for follow-up treatment.    Pt stated this week she has been hyper-focused on work, has been more tired, more negative, experienced crying spells, and anxious thoughts. Pt identified she has more intense mood swings two weeks before her period. Pt processed a recent incident at work with feeling like she overreacted. Pt stated she had an internal anger outburst. Pt identified trigger of feeling like her time was being wasted due to repeating herself. Pt attempted to cope with the use of meditation at work. Pt explained she has trouble with clients who are entitled.     Pt processed feeling of frustration and guilt with not having enough time for her children. Pt has anger towards her ex for not being there for their children.     Pt has been utilizing some coping skills go meditations, prayer, and spending some time outside.     LPC and Pt discussed her current relationship. Pt identified boundaries with boyfriend. Pt stated she has been enjoying time they spend together.    LPC and Pt discussed utilizing a CBT negrito to track her mood swing and use a daily check in. LPC provided psycho education on the use of sour candy to help with anger/anxiety in the moment. Pt stated she will try the use of sour candy.     Pt appeared more anxious, on edge, and concerned about her medications causing her to be more tired.   Current symptoms:  Depression: worthlessness/guilt, hopelessness, fatigue, and difficulty concentrating.   Anxiety: excessive worrying, restlessness, muscle tension, panic attacks, and obsessions/compulsions. Pt has been having tension in her head and chest.   Sleep: difficulty falling asleep. Pt was taking Xanax lately.   Danette:  denies.  Psychosis: denies .  Appetite: Pt denied  appetite changes  Motivation/Energy: More tired since Prozac   Therapeutic Intervention/Techniques: behavior modification, insight oriented, and supportive; relevant to diagnosis, patient responds to this modality    Will continue to follow.   Pt aware to contact LPC for any additional needs that may occur prior to next session.    Risk Parameters:  Patient reports no suicidal ideation  Patient reports no homicidal ideation  Patient reports no self-injurious behavior  Patient reports no violent behavior    Diagnosis:   1. JOSIE (generalized anxiety disorder)        2. Moderate episode of recurrent major depressive disorder        3. Stress at home            Return to Clinic: 1 week  Counseling time: 45  -Call to report any worsening of symptoms or problems associated with medication.  - Pt instructed to go to ER if thoughts of harming self or others arise.   -Supportive therapy and psychoeducation provided  -Pt instructed to call clinic, 911 or go to nearest emergency room if sxs worsen or pt is in crisis. The pt expresses understanding.   Each patient to whom he or she provides medical services by telemedicine is:  (1) informed of the relationship between the physician and patient and the respective role of any other health care provider with respect to management of the patient; and (2) notified that he or she may decline to receive medical services by telemedicine and may withdraw from such care at any time.

## 2024-07-05 NOTE — TELEPHONE ENCOUNTER
Called and left voice mail for patient to call to schedule new patient appointment. Patient only wants to see MD.

## 2024-07-17 ENCOUNTER — PATIENT MESSAGE (OUTPATIENT)
Dept: PSYCHIATRY | Facility: CLINIC | Age: 44
End: 2024-07-17
Payer: COMMERCIAL

## 2024-07-19 ENCOUNTER — CLINICAL SUPPORT (OUTPATIENT)
Dept: PSYCHIATRY | Facility: CLINIC | Age: 44
End: 2024-07-19
Payer: COMMERCIAL

## 2024-07-19 DIAGNOSIS — F41.1 GAD (GENERALIZED ANXIETY DISORDER): Primary | ICD-10-CM

## 2024-07-19 DIAGNOSIS — F33.1 MODERATE EPISODE OF RECURRENT MAJOR DEPRESSIVE DISORDER: ICD-10-CM

## 2024-07-19 DIAGNOSIS — F43.9 STRESS AT HOME: ICD-10-CM

## 2024-07-19 PROCEDURE — 99999 PR PBB SHADOW E&M-EST. PATIENT-LVL I: CPT | Mod: PBBFAC,,, | Performed by: COUNSELOR

## 2024-07-19 NOTE — PROGRESS NOTES
Individual Psychotherapy (LPC)    07/29/2024    The patient location is: Pt reported in her car at work.  The patient phone number is: [phone]   Visit type: Virtual visit with synchronous audio and video  Each patient to whom he or she provides medical services by telemedicine is:  (1) informed of the relationship between the provider and patient and the respective role of any other health care provider with respect to management of the patient; and (2) notified that he or she may decline to receive medical services by telemedicine and may withdraw from such care at any time.  Crisis Disclaimer: Patient was informed that due to the virtual nature of the visit, that if a crisis develops, protocols will be implemented to ensure patient safety, including but not limited to: 1) Initiating a welfare check with local Law Enforcement, 2) Calling [employer]/National Crisis Hotline, and/or 3) Initiating PEC/CEC procedures.    Interim Events/Subjective Report/Content of Current Session:  follow-up appointment.    Pt is a 44 y.o. female with past psychiatric hx of Anxiety and Depression who presents for follow-up treatment.    Pt reported last week things were fine. Pt explained she had a steady work week. Pt reported last night her and her sister went well. Pt expressed anger towards her father. Pt explained she won't call him due to pride. Pt stated her father doesn't reach out. And doesn't check in. LPC asked about last time she spoke with her father on Father's Day. Pt explained weekend Pt spent time at home. Pt reported cleaning her room. Pt identified feelings of abandonment and seeking a deeper connection with him since the death of her mother.    Pt has stress with start school next week. Pt has money concerns to get her daughter a car. Pt has been working on boundaries with her children.      LPC and Pt discussed her boyfriend. Pt reported spent time with her boyfriend last week. Pt has concerns of him being resentful.  Pt explained her past relationships have impacted her current relationship. Pt identifed if she doesn't get attention she feels not wanted.    LPC and Pt discussed coping skills used over the last week. Pt stated she downloaded the [employer]. Pt noted feeling okay. Pt reported using meditations 1-2 daily, breathing exercises, HALT, and took personal day on Friday. Pt has been meditating in the morning and afternoon. Pt shared most morning feeling tired when using HALT.    LPC and Pt discussed negative self-talk. Pt reported catching herself daily. Pt explained she utilized social media for positive affirmation and meditations. Pt explained she has a lot of negative thoughts. LPC suggested for Pt counter negative thoughts with two positive statements, continue to use [employer], and continue HALT for our next visit.       LPC and Pt addressed goals of boundary setting, anger management, and anxiety.    Goals: boundary setting, anger management, anxiety.    Current symptoms:  Depression: insomnia, fatigue, and difficulty concentrating.  Anxiety: excessive worrying, restlessness, and muscle tension. Pt explained less anxious. Pt identified heighten anxiety 2 week before her period.  Sleep: difficulty falling asleep. Pt reported insomnia last night.   Danette:  denies.  Psychosis: denies .  Appetite: Pt reported consistent.   Motivation/Energy: Pt has still been struggling motivation.   Therapeutic Intervention/Techniques: behavior modification, insight oriented, and supportive; relevant to diagnosis, patient responds to this modality    Will continue to follow.   Pt aware to contact [facility] for any additional needs that may occur prior to next session.    Risk Parameters:  Patient reports no suicidal ideation  Patient reports no homicidal ideation  Patient reports no self-injurious behavior  Patient reports no violent behavior    Diagnosis:   1. JOSIE (generalized anxiety disorder)        2. Moderate episode of recurrent major  depressive disorder        3. Stress at home        4. Stress at work            Return to Clinic: 1 week  Counseling time: 45  -Call to report any worsening of symptoms or problems associated with medication.  - Pt instructed to go to ER if thoughts of harming self or others arise.   -Supportive therapy and psychoeducation provided  -Pt instructed to call clinic, 911 or go to nearest emergency room if sxs worsen or pt is in crisis. The pt expresses understanding.   Each patient to whom he or she provides medical services by telemedicine is:  (1) informed of the relationship between the physician and patient and the respective role of any other health care provider with respect to management of the patient; and (2) notified that he or she may decline to receive medical services by telemedicine and may withdraw from such care at any time.

## 2024-07-25 ENCOUNTER — PATIENT MESSAGE (OUTPATIENT)
Dept: PSYCHIATRY | Facility: CLINIC | Age: 44
End: 2024-07-25
Payer: COMMERCIAL

## 2024-07-29 ENCOUNTER — CLINICAL SUPPORT (OUTPATIENT)
Dept: PSYCHIATRY | Facility: CLINIC | Age: 44
End: 2024-07-29
Payer: COMMERCIAL

## 2024-07-29 DIAGNOSIS — F33.1 MODERATE EPISODE OF RECURRENT MAJOR DEPRESSIVE DISORDER: ICD-10-CM

## 2024-07-29 DIAGNOSIS — F41.1 GAD (GENERALIZED ANXIETY DISORDER): Primary | ICD-10-CM

## 2024-07-29 DIAGNOSIS — F43.9 STRESS AT HOME: ICD-10-CM

## 2024-07-29 DIAGNOSIS — Z56.6 STRESS AT WORK: ICD-10-CM

## 2024-07-29 PROCEDURE — 90834 PSYTX W PT 45 MINUTES: CPT | Mod: 95,,, | Performed by: COUNSELOR

## 2024-07-29 SDOH — SOCIAL DETERMINANTS OF HEALTH (SDOH): OTHER PHYSICAL AND MENTAL STRAIN RELATED TO WORK: Z56.6

## 2024-08-05 ENCOUNTER — CLINICAL SUPPORT (OUTPATIENT)
Dept: PSYCHIATRY | Facility: CLINIC | Age: 44
End: 2024-08-05
Payer: COMMERCIAL

## 2024-08-05 DIAGNOSIS — Z56.6 STRESS AT WORK: ICD-10-CM

## 2024-08-05 DIAGNOSIS — F33.1 MODERATE EPISODE OF RECURRENT MAJOR DEPRESSIVE DISORDER: ICD-10-CM

## 2024-08-05 DIAGNOSIS — F41.1 GAD (GENERALIZED ANXIETY DISORDER): Primary | ICD-10-CM

## 2024-08-05 DIAGNOSIS — F43.9 STRESS AT HOME: ICD-10-CM

## 2024-08-05 PROCEDURE — 90834 PSYTX W PT 45 MINUTES: CPT | Mod: S$GLB,,, | Performed by: COUNSELOR

## 2024-08-05 SDOH — SOCIAL DETERMINANTS OF HEALTH (SDOH): OTHER PHYSICAL AND MENTAL STRAIN RELATED TO WORK: Z56.6

## 2024-08-05 NOTE — PROGRESS NOTES
Individual Psychotherapy (LPC)    08/12/2024    Interim Events/Subjective Report/Content of Current Session:  follow-up appointment.    Pt is a 44 y.o. female with past psychiatric hx of Anxiety and Depression who presents for follow-up treatment.  LPC and Pt discussed kids meeting her boyfriend. Pt endorsed going well.     LPC and Pt discussed coping skills used over the last week. Pt reported meditation, breathing, mood tracking, and jazz music.      LPC and Pt discussed negative self-talk. Pt reported catching herself daily. Pt negative feelings about how she views herself and the things she does for others.  PT was able to provide some positive self talk through those negative feelings.    LPC and Pt discussed the use of journaling about her mothers death. Pt reported this week anniversary of mothers death. Pt stated it comes in waves. Pt stated taking off this Wednesday to go to [employer]. Pt endorsed trigger by face cream her mom used. Pt has been unsure of the effectiveness of journaling.     LPC and Pt discussed mood tracking. Pt has been more tired. Pt explained feel spaced out and depressed. Pt has been experiencing irritability.      LPC and Pt processed anger. Pt denied any anger. Pt endorsed irritability with managing time.  LPC provided psychoeducation on the use of the Eisenhower matrix.  PT appeared excited.  LPC provided PT with copies of the Eisenhower matrix to utilize over the next 2 weeks.     LPC and PT discussed the use of box breathing. Pt denied useful.     LPC and Pt addressed goals of anxiety and anger management.    Goals: boundary setting, anger management, anxiety.    Current symptoms:  Depression: insomnia, fatigue, and difficulty concentrating. Pt reported less tired.   Anxiety: excessive worrying, restlessness, and muscle tension. Pt rated anxiety 2/10 and only a 2 days before school 10/10.   Sleep:  Pt reporting dreaming and sleeping more . Pt reported needed medicine less this past  week. Pt has been waking up slowly.   Danette:  denies.  Psychosis: denies .  Appetite: Pt reported consistent.   Motivation/Energy: Pt has still been struggling motivation.   Therapeutic Intervention/Techniques: behavior modification, insight oriented, and supportive; relevant to diagnosis, patient responds to this modality    Will continue to follow.   Pt aware to contact LPC for any additional needs that may occur prior to next session.    Risk Parameters:  Patient reports no suicidal ideation  Patient reports no homicidal ideation  Patient reports no self-injurious behavior  Patient reports no violent behavior    Diagnosis:   1. JOSEI (generalized anxiety disorder)        2. Moderate episode of recurrent major depressive disorder              Return to Clinic: 2 weeks  Counseling time: 30  -Call to report any worsening of symptoms or problems associated with medication.  - Pt instructed to go to ER if thoughts of harming self or others arise.   -Supportive therapy and psychoeducation provided  -Pt instructed to call clinic, 911 or go to nearest emergency room if sxs worsen or pt is in crisis. The pt expresses understanding.   Each patient to whom he or she provides medical services by telemedicine is:  (1) informed of the relationship between the physician and patient and the respective role of any other health care provider with respect to management of the patient; and (2) notified that he or she may decline to receive medical services by telemedicine and may withdraw from such care at any time.

## 2024-08-12 ENCOUNTER — CLINICAL SUPPORT (OUTPATIENT)
Dept: PSYCHIATRY | Facility: CLINIC | Age: 44
End: 2024-08-12
Payer: COMMERCIAL

## 2024-08-12 DIAGNOSIS — F33.1 MODERATE EPISODE OF RECURRENT MAJOR DEPRESSIVE DISORDER: ICD-10-CM

## 2024-08-12 DIAGNOSIS — F41.1 GAD (GENERALIZED ANXIETY DISORDER): Primary | ICD-10-CM

## 2024-08-12 PROCEDURE — 90832 PSYTX W PT 30 MINUTES: CPT | Mod: S$GLB,,, | Performed by: COUNSELOR

## 2024-08-14 ENCOUNTER — TELEPHONE (OUTPATIENT)
Dept: NEUROLOGY | Facility: CLINIC | Age: 44
End: 2024-08-14
Payer: COMMERCIAL

## 2024-09-25 DIAGNOSIS — F41.9 ANXIETY: ICD-10-CM

## 2024-09-25 NOTE — TELEPHONE ENCOUNTER
No care due was identified.  [facility] Embedded Care Due Messages. Reference number: [id number].   9/25/2024 5:36:10 PM CDT

## 2024-09-26 RX ORDER — ALPRAZOLAM 0.5 MG/1
0.5 TABLET ORAL NIGHTLY PRN
Qty: 15 TABLET | Refills: 0 | Status: SHIPPED | OUTPATIENT
Start: 2024-09-26

## 2024-09-27 NOTE — TELEPHONE ENCOUNTER
Encounter opened to request a pa in epic.   
I have personally seen and examined the patient. I have collaborated with and supervised the

## 2024-11-18 ENCOUNTER — DOCUMENTATION ONLY (OUTPATIENT)
Dept: FAMILY MEDICINE | Facility: CLINIC | Age: 44
End: 2024-11-18
Payer: COMMERCIAL

## 2024-11-18 NOTE — PROGRESS NOTES
Confirmed with Lili at pt's pharmacy that a prior authorization is not needed for Fluoxetine 40 mg. Lili states the RX will cost the pt $4.15 and it is on the truck being delivered today.

## 2024-11-24 DIAGNOSIS — F41.9 ANXIETY: ICD-10-CM

## 2024-11-24 NOTE — TELEPHONE ENCOUNTER
No care due was identified.  [facility] Embedded Care Due Messages. Reference number: [id number].   11/24/2024 11:24:19 AM CST

## 2024-11-25 RX ORDER — ALPRAZOLAM 0.5 MG/1
0.5 TABLET ORAL NIGHTLY PRN
Qty: 15 TABLET | Refills: 0 | Status: SHIPPED | OUTPATIENT
Start: 2024-11-25

## 2024-12-29 DIAGNOSIS — G43.109 MIGRAINE WITH AURA AND WITHOUT STATUS MIGRAINOSUS, NOT INTRACTABLE: ICD-10-CM

## 2024-12-29 DIAGNOSIS — F41.9 ANXIETY: ICD-10-CM

## 2024-12-30 RX ORDER — ALPRAZOLAM 0.5 MG/1
0.5 TABLET ORAL NIGHTLY PRN
Qty: 15 TABLET | Refills: 0 | Status: SHIPPED | OUTPATIENT
Start: 2024-12-30

## 2024-12-30 RX ORDER — SUMATRIPTAN SUCCINATE 50 MG/1
TABLET ORAL
Qty: 9 TABLET | Refills: 2 | Status: SHIPPED | OUTPATIENT
Start: 2024-12-30

## 2024-12-30 NOTE — TELEPHONE ENCOUNTER
No care due was identified.  [facility] Embedded Care Due Messages. Reference number: [id number].   12/29/2024 8:22:29 PM CST

## 2025-01-19 DIAGNOSIS — F41.9 ANXIETY: ICD-10-CM

## 2025-01-19 NOTE — TELEPHONE ENCOUNTER
No care due was identified.  [facility] Embedded Care Due Messages. Reference number: [id number].   1/19/2025 3:36:37 PM CST

## 2025-01-21 RX ORDER — ALPRAZOLAM 0.5 MG/1
0.5 TABLET ORAL NIGHTLY PRN
Qty: 15 TABLET | Refills: 0 | OUTPATIENT
Start: 2025-01-21

## 2025-02-23 ENCOUNTER — DOCUMENTATION ONLY (OUTPATIENT)
Dept: FAMILY MEDICINE | Facility: CLINIC | Age: 45
End: 2025-02-23
Payer: COMMERCIAL

## 2025-02-23 NOTE — PROGRESS NOTES
Melania Lipscomb (Key: [id number])  Need Help? Call us at [phone]  OUTCOME  Drug is covered by current benefit plan. No further PA activity needed  Drug  FLUoxetine HCl 20MG capsules    Form  [employer] Electronic PA Form (2017 [place])

## 2025-02-23 NOTE — TELEPHONE ENCOUNTER
Melania Lipscomb (Key: [id number])  Need Help? Call us at [phone]  Outcome  Additional Information Required  Drug is covered by current benefit plan. No further PA activity needed  Drug  FLUoxetine HCl 20MG capsules    Form  [employer] Electronic PA Form (2017 [place]CPDP)

## 2025-05-06 ENCOUNTER — OFFICE VISIT (OUTPATIENT)
Dept: FAMILY MEDICINE | Facility: CLINIC | Age: 45
End: 2025-05-06
Payer: COMMERCIAL

## 2025-05-06 DIAGNOSIS — R53.83 OTHER FATIGUE: ICD-10-CM

## 2025-05-06 DIAGNOSIS — F33.2 SEVERE EPISODE OF RECURRENT MAJOR DEPRESSIVE DISORDER, WITHOUT PSYCHOTIC FEATURES: Primary | ICD-10-CM

## 2025-05-06 DIAGNOSIS — I95.89 OTHER SPECIFIED HYPOTENSION: ICD-10-CM

## 2025-05-06 DIAGNOSIS — Z12.4 SCREENING FOR CERVICAL CANCER: ICD-10-CM

## 2025-05-06 DIAGNOSIS — R20.0 NUMBNESS: ICD-10-CM

## 2025-05-06 DIAGNOSIS — D50.8 OTHER IRON DEFICIENCY ANEMIA: ICD-10-CM

## 2025-05-06 DIAGNOSIS — N92.1 MENOMETRORRHAGIA: ICD-10-CM

## 2025-05-06 DIAGNOSIS — F41.9 ANXIETY: ICD-10-CM

## 2025-05-06 PROCEDURE — 99214 OFFICE O/P EST MOD 30 MIN: CPT | Mod: S$GLB,,, | Performed by: FAMILY MEDICINE

## 2025-05-06 PROCEDURE — 3008F BODY MASS INDEX DOCD: CPT | Mod: CPTII,S$GLB,, | Performed by: FAMILY MEDICINE

## 2025-05-06 PROCEDURE — 3078F DIAST BP <80 MM HG: CPT | Mod: CPTII,S$GLB,, | Performed by: FAMILY MEDICINE

## 2025-05-06 PROCEDURE — 3074F SYST BP LT 130 MM HG: CPT | Mod: CPTII,S$GLB,, | Performed by: FAMILY MEDICINE

## 2025-05-06 PROCEDURE — 1159F MED LIST DOCD IN RCRD: CPT | Mod: CPTII,S$GLB,, | Performed by: FAMILY MEDICINE

## 2025-05-06 PROCEDURE — 99999 PR PBB SHADOW E&M-EST. PATIENT-LVL IV: CPT | Mod: PBBFAC,,, | Performed by: FAMILY MEDICINE

## 2025-05-06 RX ORDER — ALPRAZOLAM 0.5 MG/1
0.5 TABLET ORAL NIGHTLY PRN
Qty: 30 TABLET | Refills: 2 | Status: SHIPPED | OUTPATIENT
Start: 2025-05-06

## 2025-05-06 NOTE — PROGRESS NOTES
Assessment:       1. Severe episode of recurrent major depressive disorder, without psychotic features    2. Other iron deficiency anemia    3. Other fatigue    4. Numbness    5. Screening for cervical cancer    6. Menometrorrhagia    7. Anxiety    8. Other specified hypotension        Plan:       Severe episode of recurrent major depressive disorder, without psychotic features: Improved    Other iron deficiency anemia:  Worsening  -     Cancel: CBC Without Differential; Future; Expected date: 05/06/2025  -     Iron and TIBC; Future; Expected date: 05/06/2025  -     Ferritin; Future; Expected date: 05/06/2025  -     CBC Without Differential; Future; Expected date: 05/06/2025    Other fatigue: Worsening  -     Comprehensive Metabolic Panel; Future; Expected date: 05/06/2025  -     TSH; Future; Expected date: 05/06/2025    Numbness: Worsening  -     Folate; Future; Expected date: 05/06/2025  -     Vitamin B12; Future; Expected date: 05/06/2025    Screening for cervical cancer  -     Ambulatory referral/consult to Gynecology; Future; Expected date: 05/13/2025    Menometrorrhagia: Worsening  -     Ambulatory referral/consult to Gynecology; Future; Expected date: 05/13/2025    Anxiety: Stable  -     ALPRAZolam (XANAX) 0.5 MG tablet; Take 1 tablet (0.5 mg total) by mouth nightly as needed for Anxiety.  Dispense: 30 tablet; Refill: 2    Other specified hypotension: New problem workup needed         Recommend the patient to drink plenty water, will repeat labs, [place] prescription monitoring program was checked and okay, Xanax was prescribed for the patient, secondary to mental Medrol ratio, recommend the patient an appointment to see the gynecologist and also for a Pap smear.  For depression, the patient will continue taking fluoxetine.     Patient agreed with assessment and plan. Patient verbalized understanding.     Subjective:       Patient ID: Melania Lipscomb is a 45 y.o. female.    Chief Complaint: Follow-up (6  month follow up )    HPI    The patient is here today for a follow-up visit.  The patient feels very tired, she would like to sleep a lot, the last hemoglobin showed presence of anemia plus iron levels low.  The patient has been taking some iron tablets.  She has heavy menstrual cycle and painful.  The patient stated the 1st 3 days that usually the most heavy.  She has anxiety, taking Xanax a nighttime as needed for insomnia, the patient stated that she does not drink too much water and this morning she not drink water, she just drank coffee.  She complains of numbness sensation.  The patient denies chest pain, shortness for breath, nausea, vomiting.  The patient has depression, she is currently taking 20 mg fluoxetine and takes 10 mg as needed some days when she feels very stress, the patient stated that her job is very demanding and stressful.  She is a single mom taking care of 4 children.    Past medical history, past social history was reviewed and discussed with the patient.    Review of Systems    Objective:      Physical Exam  Constitutional:       General: She is not in acute distress.     Appearance: Normal appearance.   HENT:      Head: Normocephalic and atraumatic.   Cardiovascular:      Rate and Rhythm: Normal rate and regular rhythm.   Pulmonary:      Effort: Pulmonary effort is normal. No respiratory distress.      Breath sounds: No wheezing.   Musculoskeletal:      Right lower leg: No edema.      Left lower leg: No edema.   Neurological:      Mental Status: She is alert.   Psychiatric:         Mood and Affect: Mood normal.         Behavior: Behavior normal.         Thought Content: Thought content normal.         Judgment: Judgment normal.

## 2025-05-07 ENCOUNTER — LAB VISIT (OUTPATIENT)
Dept: LAB | Facility: HOSPITAL | Age: 45
End: 2025-05-07
Attending: FAMILY MEDICINE
Payer: COMMERCIAL

## 2025-05-07 ENCOUNTER — RESULTS FOLLOW-UP (OUTPATIENT)
Dept: FAMILY MEDICINE | Facility: CLINIC | Age: 45
End: 2025-05-07
Payer: COMMERCIAL

## 2025-05-07 DIAGNOSIS — R53.83 OTHER FATIGUE: ICD-10-CM

## 2025-05-07 DIAGNOSIS — R20.0 NUMBNESS: ICD-10-CM

## 2025-05-07 DIAGNOSIS — D50.8 OTHER IRON DEFICIENCY ANEMIA: ICD-10-CM

## 2025-05-07 LAB
ALBUMIN SERPL BCP-MCNC: 3.5 G/DL (ref 3.5–5.2)
ALP SERPL-CCNC: 50 UNIT/L (ref 40–150)
ALT SERPL W/O P-5'-P-CCNC: 24 UNIT/L (ref 10–44)
ANION GAP (OHS): 9 MMOL/L (ref 8–16)
AST SERPL-CCNC: 25 UNIT/L (ref 11–45)
BILIRUB SERPL-MCNC: 0.2 MG/DL (ref 0.1–1)
BUN SERPL-MCNC: 9 MG/DL (ref 6–20)
CALCIUM SERPL-MCNC: 8.5 MG/DL (ref 8.7–10.5)
CHLORIDE SERPL-SCNC: 105 MMOL/L (ref 95–110)
CO2 SERPL-SCNC: 22 MMOL/L (ref 23–29)
CREAT SERPL-MCNC: 0.8 MG/DL (ref 0.5–1.4)
ERYTHROCYTE [DISTWIDTH] IN BLOOD BY AUTOMATED COUNT: 13.2 % (ref 11.5–14.5)
FERRITIN SERPL-MCNC: 45 NG/ML (ref 20–300)
FOLATE SERPL-MCNC: 9.1 NG/ML (ref 4–24)
GFR SERPLBLD CREATININE-BSD FMLA CKD-EPI: >60 ML/MIN/1.73/M2
GLUCOSE SERPL-MCNC: 89 MG/DL (ref 70–110)
HCT VFR BLD AUTO: 36.8 % (ref 37–48.5)
HGB BLD-MCNC: 12.4 GM/DL (ref 12–16)
IRON SATN MFR SERPL: 10 % (ref 20–50)
IRON SERPL-MCNC: 36 UG/DL (ref 30–160)
MCH RBC QN AUTO: 28.8 PG (ref 27–31)
MCHC RBC AUTO-ENTMCNC: 33.7 G/DL (ref 32–36)
MCV RBC AUTO: 85 FL (ref 82–98)
PLATELET # BLD AUTO: 229 K/UL (ref 150–450)
PMV BLD AUTO: 11.3 FL (ref 9.2–12.9)
POTASSIUM SERPL-SCNC: 3.8 MMOL/L (ref 3.5–5.1)
PROT SERPL-MCNC: 6.6 GM/DL (ref 6–8.4)
RBC # BLD AUTO: 4.31 M/UL (ref 4–5.4)
SODIUM SERPL-SCNC: 136 MMOL/L (ref 136–145)
TIBC SERPL-MCNC: 366 UG/DL (ref 250–450)
TRANSFERRIN SERPL-MCNC: 247 MG/DL (ref 200–375)
TSH SERPL-ACNC: 1.6 UIU/ML (ref 0.4–4)
VIT B12 SERPL-MCNC: 599 PG/ML (ref 210–950)
WBC # BLD AUTO: 3.62 K/UL (ref 3.9–12.7)

## 2025-05-07 PROCEDURE — 80053 COMPREHEN METABOLIC PANEL: CPT

## 2025-05-07 PROCEDURE — 82607 VITAMIN B-12: CPT

## 2025-05-07 PROCEDURE — 82746 ASSAY OF FOLIC ACID SERUM: CPT

## 2025-05-07 PROCEDURE — 85027 COMPLETE CBC AUTOMATED: CPT | Mod: PO

## 2025-05-07 PROCEDURE — 84466 ASSAY OF TRANSFERRIN: CPT

## 2025-05-07 PROCEDURE — 84443 ASSAY THYROID STIM HORMONE: CPT

## 2025-05-07 PROCEDURE — 82728 ASSAY OF FERRITIN: CPT

## 2025-05-07 PROCEDURE — 36415 COLL VENOUS BLD VENIPUNCTURE: CPT | Mod: PO

## 2025-05-08 VITALS
OXYGEN SATURATION: 97 % | WEIGHT: 134.06 LBS | HEIGHT: 62 IN | BODY MASS INDEX: 24.67 KG/M2 | HEART RATE: 97 BPM | SYSTOLIC BLOOD PRESSURE: 90 MMHG | DIASTOLIC BLOOD PRESSURE: 69 MMHG

## 2025-08-01 ENCOUNTER — PATIENT MESSAGE (OUTPATIENT)
Dept: FAMILY MEDICINE | Facility: CLINIC | Age: 45
End: 2025-08-01
Payer: COMMERCIAL

## 2025-08-05 DIAGNOSIS — F32.89 OTHER DEPRESSION: ICD-10-CM

## 2025-08-05 RX ORDER — FLUOXETINE 20 MG/1
CAPSULE ORAL
Qty: 90 CAPSULE | Refills: 3 | Status: SHIPPED | OUTPATIENT
Start: 2025-08-05

## 2025-08-05 NOTE — TELEPHONE ENCOUNTER
No care due was identified.  [facility] Embedded Care Due Messages. Reference number: [id number].   8/05/2025 9:17:43 AM CDT

## 2025-08-06 ENCOUNTER — OFFICE VISIT (OUTPATIENT)
Dept: FAMILY MEDICINE | Facility: CLINIC | Age: 45
End: 2025-08-06
Payer: COMMERCIAL

## 2025-08-06 ENCOUNTER — TELEPHONE (OUTPATIENT)
Dept: HEMATOLOGY/ONCOLOGY | Facility: CLINIC | Age: 45
End: 2025-08-06
Payer: COMMERCIAL

## 2025-08-06 DIAGNOSIS — R53.83 OTHER FATIGUE: ICD-10-CM

## 2025-08-06 DIAGNOSIS — F41.9 ANXIETY: ICD-10-CM

## 2025-08-06 DIAGNOSIS — E61.1 IRON DEFICIENCY: Primary | ICD-10-CM

## 2025-08-06 PROCEDURE — 98004 SYNCH AUDIO-VIDEO EST SF 10: CPT | Mod: 95,,, | Performed by: FAMILY MEDICINE

## 2025-08-06 PROCEDURE — 1160F RVW MEDS BY RX/DR IN RCRD: CPT | Mod: CPTII,95,, | Performed by: FAMILY MEDICINE

## 2025-08-06 PROCEDURE — 1159F MED LIST DOCD IN RCRD: CPT | Mod: CPTII,95,, | Performed by: FAMILY MEDICINE

## 2025-08-06 NOTE — TELEPHONE ENCOUNTER
Refill Decision Note   Melania Lipscomb  is requesting a refill authorization.  Brief Assessment and Rationale for Refill:  Approve     Medication Therapy Plan:         Alert overridden per protocol: Yes   Comments:     Note composed:10:17 PM 08/05/2025

## 2025-08-06 NOTE — TELEPHONE ENCOUNTER
Hematology referral in [place].     Spoke with pt and scheduled first available appt. Pt accepted appt and VU of date/time/location.

## 2025-08-07 ENCOUNTER — TELEPHONE (OUTPATIENT)
Dept: HEMATOLOGY/ONCOLOGY | Facility: CLINIC | Age: 45
End: 2025-08-07
Payer: COMMERCIAL

## 2025-08-07 NOTE — TELEPHONE ENCOUNTER
LMOVM pt to remind of appt tomorrow with Latoya Baker NP. Contact information was given should pt need to contact us back.

## 2025-08-08 ENCOUNTER — LAB VISIT (OUTPATIENT)
Dept: LAB | Facility: HOSPITAL | Age: 45
End: 2025-08-08
Attending: NURSE PRACTITIONER
Payer: COMMERCIAL

## 2025-08-08 DIAGNOSIS — E61.1 IRON DEFICIENCY: ICD-10-CM

## 2025-08-08 DIAGNOSIS — E61.1 IRON DEFICIENCY: Primary | ICD-10-CM

## 2025-08-08 LAB
ABSOLUTE EOSINOPHIL (OHS): 0.06 K/UL
ABSOLUTE MONOCYTE (OHS): 0.31 K/UL (ref 0.3–1)
ABSOLUTE NEUTROPHIL COUNT (OHS): 4.28 K/UL (ref 1.8–7.7)
BASOPHILS # BLD AUTO: 0.03 K/UL
BASOPHILS NFR BLD AUTO: 0.5 %
ERYTHROCYTE [DISTWIDTH] IN BLOOD BY AUTOMATED COUNT: 13 % (ref 11.5–14.5)
FERRITIN SERPL-MCNC: 42 NG/ML (ref 20–300)
HCT VFR BLD AUTO: 35.7 % (ref 37–48.5)
HGB BLD-MCNC: 12.2 GM/DL (ref 12–16)
IMM GRANULOCYTES # BLD AUTO: 0.01 K/UL (ref 0–0.04)
IMM GRANULOCYTES NFR BLD AUTO: 0.2 % (ref 0–0.5)
IRON SATN MFR SERPL: 29 % (ref 20–50)
IRON SERPL-MCNC: 107 UG/DL (ref 30–160)
LYMPHOCYTES # BLD AUTO: 1.55 K/UL (ref 1–4.8)
MCH RBC QN AUTO: 29.1 PG (ref 27–31)
MCHC RBC AUTO-ENTMCNC: 34.2 G/DL (ref 32–36)
MCV RBC AUTO: 85 FL (ref 82–98)
NUCLEATED RBC (/100WBC) (OHS): 0 /100 WBC
PLATELET # BLD AUTO: 261 K/UL (ref 150–450)
PMV BLD AUTO: 11.1 FL (ref 9.2–12.9)
RBC # BLD AUTO: 4.19 M/UL (ref 4–5.4)
RELATIVE EOSINOPHIL (OHS): 1 %
RELATIVE LYMPHOCYTE (OHS): 24.8 % (ref 18–48)
RELATIVE MONOCYTE (OHS): 5 % (ref 4–15)
RELATIVE NEUTROPHIL (OHS): 68.5 % (ref 38–73)
TIBC SERPL-MCNC: 373 UG/DL (ref 250–450)
TRANSFERRIN SERPL-MCNC: 252 MG/DL (ref 200–375)
WBC # BLD AUTO: 6.24 K/UL (ref 3.9–12.7)

## 2025-08-08 PROCEDURE — 36415 COLL VENOUS BLD VENIPUNCTURE: CPT | Mod: PN

## 2025-08-08 PROCEDURE — 82728 ASSAY OF FERRITIN: CPT

## 2025-08-08 PROCEDURE — 83540 ASSAY OF IRON: CPT

## 2025-08-08 PROCEDURE — 85025 COMPLETE CBC W/AUTO DIFF WBC: CPT | Mod: PN

## 2025-08-09 NOTE — PROGRESS NOTES
The patient location is: [place]  The chief complaint leading to consultation is:  Referral    Visit type: audiovisual    Face to Face time with patient:  12 minutes  14 minutes of total time spent on the encounter, which includes face to face time and non-face to face time preparing to see the patient (eg, review of tests), Obtaining and/or reviewing separately obtained history, Documenting clinical information in the electronic or other health record, Independently interpreting results (not separately reported) and communicating results to the patient/family/caregiver, or Care coordination (not separately reported).         Each patient to whom he or she provides medical services by telemedicine is:  (1) informed of the relationship between the physician and patient and the respective role of any other health care provider with respect to management of the patient; and (2) notified that he or she may decline to receive medical services by telemedicine and may withdraw from such care at any time.    Notes:  The patient is here today for a follow-up visit, the patient has low hemoglobin and iron deficiency, she is feeling very tired, she would like a referral to see a hematologist for further assessment.  The patient is currently taking iron, she also feels anxious.  She is currently taking Xanax as needed and fluoxetine 30 mg daily.  She denies symptoms of chest pain and shortness of breath at this visit.    Assessment and plan:    Iron deficiency:  Stable  -     Ambulatory referral/consult to Hematology / Oncology; Future; Expected date: 08/13/2025    Other fatigue: Worsening  -     Ambulatory referral/consult to Hematology / Oncology; Future; Expected date: 08/13/2025    Anxiety: Stable       Will refer the patient to the hematologist for fatigue symptoms in iron deficiency.                        Answers submitted by the patient for this visit:  Review of Systems Questionnaire (Submitted on 8/6/2025)  activity  change: No  unexpected weight change: No  neck pain: Yes  hearing loss: No  rhinorrhea: No  trouble swallowing: No  eye discharge: No  visual disturbance: No  chest tightness: No  wheezing: No  chest pain: No  palpitations: No  blood in stool: No  constipation: Yes  vomiting: No  diarrhea: No  polydipsia: No  polyuria: Yes  difficulty urinating: No  hematuria: No  menstrual problem: No  dysuria: No  joint swelling: No  arthralgias: Yes  headaches: Yes  weakness: Yes  confusion: No  dysphoric mood: No

## 2025-08-12 ENCOUNTER — TELEPHONE (OUTPATIENT)
Dept: HEMATOLOGY/ONCOLOGY | Facility: CLINIC | Age: 45
End: 2025-08-12
Payer: COMMERCIAL

## 2025-08-15 ENCOUNTER — OFFICE VISIT (OUTPATIENT)
Dept: HEMATOLOGY/ONCOLOGY | Facility: CLINIC | Age: 45
End: 2025-08-15
Payer: COMMERCIAL

## 2025-08-15 DIAGNOSIS — R53.83 OTHER FATIGUE: ICD-10-CM

## 2025-08-15 DIAGNOSIS — E61.1 IRON DEFICIENCY: Primary | ICD-10-CM

## 2025-08-15 RX ORDER — FERROUS SULFATE 325(65) MG
325 TABLET ORAL EVERY OTHER DAY
Qty: 30 TABLET | Refills: 3 | Status: SHIPPED | OUTPATIENT
Start: 2025-08-15 | End: 2026-08-15

## 2025-08-19 ENCOUNTER — E-VISIT (OUTPATIENT)
Dept: FAMILY MEDICINE | Facility: CLINIC | Age: 45
End: 2025-08-19
Payer: COMMERCIAL

## 2025-08-19 DIAGNOSIS — F41.9 ANXIETY: Primary | ICD-10-CM

## 2025-08-19 DIAGNOSIS — F32.89 OTHER DEPRESSION: ICD-10-CM

## 2025-08-19 RX ORDER — BUPROPION HYDROCHLORIDE 150 MG/1
150 TABLET ORAL DAILY
Qty: 30 TABLET | Refills: 11 | Status: SHIPPED | OUTPATIENT
Start: 2025-08-19 | End: 2026-08-19

## 2025-08-19 RX ORDER — FLUOXETINE 10 MG/1
10 CAPSULE ORAL DAILY
Qty: 30 CAPSULE | Refills: 0 | Status: SHIPPED | OUTPATIENT
Start: 2025-08-19 | End: 2025-09-18